# Patient Record
Sex: MALE | Race: BLACK OR AFRICAN AMERICAN | Employment: FULL TIME | URBAN - METROPOLITAN AREA
[De-identification: names, ages, dates, MRNs, and addresses within clinical notes are randomized per-mention and may not be internally consistent; named-entity substitution may affect disease eponyms.]

---

## 2021-08-27 ENCOUNTER — HOSPITAL ENCOUNTER (EMERGENCY)
Age: 46
Discharge: HOME | End: 2021-08-27
Payer: MEDICAID

## 2021-08-27 ENCOUNTER — APPOINTMENT (OUTPATIENT)
Dept: CT IMAGING | Age: 46
End: 2021-08-27
Payer: MEDICAID

## 2021-08-27 VITALS
HEIGHT: 69 IN | OXYGEN SATURATION: 98 % | TEMPERATURE: 97.9 F | BODY MASS INDEX: 32.58 KG/M2 | DIASTOLIC BLOOD PRESSURE: 95 MMHG | RESPIRATION RATE: 16 BRPM | HEART RATE: 67 BPM | WEIGHT: 220 LBS | SYSTOLIC BLOOD PRESSURE: 143 MMHG

## 2021-08-27 DIAGNOSIS — R03.0 ELEVATED BLOOD PRESSURE READING WITHOUT DIAGNOSIS OF HYPERTENSION: ICD-10-CM

## 2021-08-27 DIAGNOSIS — R10.9 LEFT FLANK PAIN: Primary | ICD-10-CM

## 2021-08-27 LAB
A/G RATIO: 1.6 (ref 1.1–2.2)
ALBUMIN SERPL-MCNC: 4.5 G/DL (ref 3.4–5)
ALP BLD-CCNC: 73 U/L (ref 40–129)
ALT SERPL-CCNC: 15 U/L (ref 10–40)
ANION GAP SERPL CALCULATED.3IONS-SCNC: 12 MMOL/L (ref 3–16)
AST SERPL-CCNC: 16 U/L (ref 15–37)
BASOPHILS ABSOLUTE: 0.1 K/UL (ref 0–0.2)
BASOPHILS RELATIVE PERCENT: 0.8 %
BILIRUB SERPL-MCNC: 1.2 MG/DL (ref 0–1)
BILIRUBIN URINE: NEGATIVE
BLOOD, URINE: NEGATIVE
BUN BLDV-MCNC: 7 MG/DL (ref 7–20)
CALCIUM SERPL-MCNC: 9.8 MG/DL (ref 8.3–10.6)
CHLORIDE BLD-SCNC: 100 MMOL/L (ref 99–110)
CLARITY: ABNORMAL
CO2: 26 MMOL/L (ref 21–32)
COLOR: YELLOW
CREAT SERPL-MCNC: 0.9 MG/DL (ref 0.9–1.3)
EOSINOPHILS ABSOLUTE: 0 K/UL (ref 0–0.6)
EOSINOPHILS RELATIVE PERCENT: 0.5 %
EPITHELIAL CELLS, UA: NORMAL /HPF (ref 0–5)
GFR AFRICAN AMERICAN: >60
GFR NON-AFRICAN AMERICAN: >60
GLOBULIN: 2.9 G/DL
GLUCOSE BLD-MCNC: 126 MG/DL (ref 70–99)
GLUCOSE URINE: NEGATIVE MG/DL
HCT VFR BLD CALC: 43.9 % (ref 40.5–52.5)
HEMOGLOBIN: 14.7 G/DL (ref 13.5–17.5)
KETONES, URINE: 40 MG/DL
LEUKOCYTE ESTERASE, URINE: NEGATIVE
LIPASE: 63 U/L (ref 13–60)
LYMPHOCYTES ABSOLUTE: 1.6 K/UL (ref 1–5.1)
LYMPHOCYTES RELATIVE PERCENT: 19.6 %
MCH RBC QN AUTO: 26.3 PG (ref 26–34)
MCHC RBC AUTO-ENTMCNC: 33.6 G/DL (ref 31–36)
MCV RBC AUTO: 78.3 FL (ref 80–100)
MICROSCOPIC EXAMINATION: YES
MONOCYTES ABSOLUTE: 0.6 K/UL (ref 0–1.3)
MONOCYTES RELATIVE PERCENT: 6.7 %
NEUTROPHILS ABSOLUTE: 6 K/UL (ref 1.7–7.7)
NEUTROPHILS RELATIVE PERCENT: 72.4 %
NITRITE, URINE: NEGATIVE
PDW BLD-RTO: 14.6 % (ref 12.4–15.4)
PH UA: 6.5 (ref 5–8)
PLATELET # BLD: 236 K/UL (ref 135–450)
PMV BLD AUTO: 7.8 FL (ref 5–10.5)
POTASSIUM REFLEX MAGNESIUM: 3.9 MMOL/L (ref 3.5–5.1)
PROTEIN UA: NEGATIVE MG/DL
RBC # BLD: 5.6 M/UL (ref 4.2–5.9)
RBC UA: NORMAL /HPF (ref 0–4)
SODIUM BLD-SCNC: 138 MMOL/L (ref 136–145)
SPECIFIC GRAVITY UA: 1 (ref 1–1.03)
TOTAL PROTEIN: 7.4 G/DL (ref 6.4–8.2)
URINE REFLEX TO CULTURE: ABNORMAL
URINE TYPE: ABNORMAL
UROBILINOGEN, URINE: 0.2 E.U./DL
WBC # BLD: 8.2 K/UL (ref 4–11)
WBC UA: NORMAL /HPF (ref 0–5)

## 2021-08-27 PROCEDURE — 80053 COMPREHEN METABOLIC PANEL: CPT

## 2021-08-27 PROCEDURE — 85025 COMPLETE CBC W/AUTO DIFF WBC: CPT

## 2021-08-27 PROCEDURE — 83690 ASSAY OF LIPASE: CPT

## 2021-08-27 PROCEDURE — 81001 URINALYSIS AUTO W/SCOPE: CPT

## 2021-08-27 PROCEDURE — 74176 CT ABD & PELVIS W/O CONTRAST: CPT

## 2021-08-27 PROCEDURE — 36415 COLL VENOUS BLD VENIPUNCTURE: CPT

## 2021-08-27 PROCEDURE — 99283 EMERGENCY DEPT VISIT LOW MDM: CPT

## 2021-08-27 ASSESSMENT — ENCOUNTER SYMPTOMS
CONSTIPATION: 0
NAUSEA: 0
DIARRHEA: 0
VOMITING: 0
ABDOMINAL PAIN: 1
SHORTNESS OF BREATH: 0
COLOR CHANGE: 0
BACK PAIN: 1

## 2021-08-27 ASSESSMENT — PAIN DESCRIPTION - PAIN TYPE: TYPE: ACUTE PAIN

## 2021-08-27 ASSESSMENT — PAIN DESCRIPTION - LOCATION: LOCATION: BACK;ABDOMEN

## 2021-08-27 ASSESSMENT — PAIN SCALES - GENERAL: PAINLEVEL_OUTOF10: 4

## 2021-08-27 NOTE — ED PROVIDER NOTES
**EVALUATED BY LUCÍA**    [address] [facility]  eMERGENCY dEPARTMENT eNCOUnter    Pt Name: Paulita Brunner  MRN: [id number]  Jessgfskylar 1975  Date of evaluation: 8/27/2021  Provider: RON Lewis    Chief Complaint:    Chief Complaint   Patient presents with    Flank Pain     Left sided flank pain radiating to front of abd. Intermittent x 1 month. Nursing Notes, Past Medical Hx, Past Surgical Hx, Social Hx, Allergies, and Family Hx were all reviewed and agreedwith or any disagreements were addressed in the HPI.    HPI:  (Location, Duration, Timing, Severity, Quality, Assoc Sx, Context, Modifying factors)  This is a  39 y.o. male who presents to the emergency department with complaints of intermittent left-sided flank pain radiating into the left front abdomen intermittently for 1 month. Patient states that he used to be a heavy drinker and has recently started improving his diet, cut out drinking and increasing his fiber intake. He is trying to become healthier. He is concerned that his several years of drinking has messed up his kidneys and he is very tearful during examination that there could be something wrong with his kidneys. No prior history of kidney disease. No prior history of hypertension. Has not been to see a primary care physician in over 3 years, called his old PCP but was refused since he has not been to the office in 3 years. He presents today worried about kidney function. At this time he denies any abdominal pain or flank pain but states that he does intermittently have left-sided flank pain that radiates into the left side. This does not radiate however into the genitals at all. He states that sometimes he does report increased urination and decreased urinary output but denies any dysuria hematuria or penile or scrotal discomfort swelling or pain. No alleviating factors.   He states that intermittently he will smoke a joint and yesterday he smoked half of a joint and had the same pain, he is unsure if this is related. He has had intentional weight loss secondary to becoming healthier. Denies fevers chills cough congestion or chest pain. No rash or skin discoloration. All other systems were reviewed and are negative. Past Medical/Surgical History:  History reviewed. No pertinent past medical history. History reviewed. No pertinent surgical history. Medications: There are no discharge medications for this patient. Review of Systems:  Review of Systems   Constitutional: Negative for chills and fever. Respiratory: Negative for shortness of breath. Cardiovascular: Negative for chest pain. Gastrointestinal: Positive for abdominal pain (intermittent one month). Negative for constipation, diarrhea, nausea and vomiting. Genitourinary: Positive for flank pain (intermittent 1 month). Negative for difficulty urinating, discharge, dysuria, frequency, genital sores, hematuria, penile pain, penile swelling, scrotal swelling, testicular pain and urgency. Musculoskeletal: Positive for back pain. Negative for neck pain. Skin: Negative for color change and rash. All other systems reviewed and are negative. Positives and Pertinent negatives as per HPI. Except as noted above in the ROS, all other systems were reviewed/completed and are negative. Physical Exam:  Physical Exam  Vitals and nursing note reviewed. Constitutional:       Appearance: Normal appearance. He is well-developed. He is not toxic-appearing or diaphoretic. HENT:      Head: Normocephalic. Right Ear: External ear normal.      Left Ear: External ear normal.      Nose: Nose normal.   Eyes:      General:         Right eye: No discharge. Left eye: No discharge. Conjunctiva/sclera: Conjunctivae normal.   Cardiovascular:      Rate and Rhythm: Normal rate and regular rhythm. Heart sounds: Normal heart sounds. No murmur heard. No friction rub. No gallop. Pulmonary:      Effort: Pulmonary effort is normal. No respiratory distress. Breath sounds: Normal breath sounds. No wheezing or rales. Abdominal:      General: Bowel sounds are normal. There is no distension. Palpations: Abdomen is soft. There is no mass or pulsatile mass. Tenderness: There is no abdominal tenderness. There is no guarding or rebound. Musculoskeletal:         General: Normal range of motion. Cervical back: Normal range of motion and neck supple. Skin:     General: Skin is warm and dry. Coloration: Skin is not pale. Neurological:      Mental Status: He is alert and oriented to person, place, and time. Psychiatric:         Behavior: Behavior normal. Behavior is cooperative.          MEDICAL DECISION MAKING    Vitals:    Vitals:    08/27/21 0845 08/27/21 0851 08/27/21 1012   BP: (!) 156/75  (!) 143/95   Pulse: 75  67   Resp: 18  16   Temp: 98.3 °F (36.8 °C)  97.9 °F (36.6 °C)   TempSrc: Oral  Infrared   SpO2: 98%  98%   Weight:  220 lb (99.8 kg)    Height:  5' 9\" (1.753 m)        LABS:   Labs Reviewed   CBC WITH AUTO DIFFERENTIAL - Abnormal; Notable for the following components:       Result Value    MCV 78.3 (*)     All other components within normal limits    Narrative:     Performed at:  [facility] [address], [place], [address]   Phone [phone]   COMPREHENSIVE METABOLIC PANEL W/ REFLEX TO MG FOR LOW K - Abnormal; Notable for the following components:    Glucose 126 (*)     Total Bilirubin 1.2 (*)     All other components within normal limits    Narrative:     Performed at:  [facility] [address], [place], [address]   Phone [phone]   LIPASE - Abnormal; Notable for the following components:    Lipase 63.0 (*)     All other components within normal limits    Narrative:     Performed at:  [facility] [address], [place] [address]   Phone [phone]   URINE RT REFLEX TO CULTURE - Abnormal; Notable for the following components:    Clarity, UA CLOUDY (*)     Ketones, Urine 40 (*)     All other components within normal limits    Narrative:     Performed at:  [facility]  [address] [facility], [address] [facility]   Phone [phone]   MICROSCOPIC URINALYSIS    Narrative:     Performed at:  [facility]  [address] [facility], [address] [facility]   Phone [phone] of labs reviewed and were negative at this time or not returned at the time of this note. RADIOLOGY:   Non-plain film images suchas CT, Ultrasound and MRI are read by the radiologist. Alesha DONOVAN PA have directly visualized the radiologic plain film image(s) with the below findings:  Interpretation per the Radiologist below, if available at the time of this note:    CT ABDOMEN PELVIS WO CONTRAST Additional Contrast? None   Final Result   1. No acute process. In particular, no urolithiasis or obstructive uropathy   2. Colonic diverticulosis              CT ABDOMEN PELVIS WO CONTRAST Additional Contrast? None    Result Date: 8/27/2021  EXAMINATION: CT OF THE ABDOMEN AND PELVIS WITHOUT CONTRAST 8/27/2021 9:08 am TECHNIQUE: CT of the abdomen and pelvis was performed without the administration of intravenous contrast. Multiplanar reformatted images are provided for review. Dose modulation, iterative reconstruction, and/or weight based adjustment of the mA/kV was utilized to reduce the radiation dose to as low as reasonably achievable. COMPARISON: None.  HISTORY: ORDERING SYSTEM PROVIDED HISTORY: left flank pain, radiation left abdomen TECHNOLOGIST PROVIDED HISTORY: Reason for exam:->left flank pain, radiation left abdomen Additional Contrast?->None Decision Support Exception - unselect if not a suspected or confirmed emergency medical condition->Emergency Medical Condition (MA) Reason for Exam: left flank pain, radiation left abdomen Acuity: Unknown Type of Exam: Unknown FINDINGS: Lower Chest: Lung bases clear Organs: No urolithiasis or hydronephrosis. Solid organs and gallbladder have an unremarkable noncontrast appearance except for 1 cm hepatic cyst. GI/Bowel: Colonic diverticula with no associated inflammation. No other gastrointestinal abnormality. Normal appendix Pelvis: Urinary bladder unremarkable. No pelvic fluid Peritoneum/Retroperitoneum: No ascites or pneumoperitoneum. Normal caliber aorta Bones/Soft Tissues: No acute bony abnormality. Degenerative disc disease L4-L5 and L5-S1     1. No acute process. In particular, no urolithiasis or obstructive uropathy 2. Colonic diverticulosis        MEDICAL DECISION MAKING / ED COURSE:      PROCEDURES:   Procedures    Patient was given:  Medications - No data to display  This is a  39 y.o. male who presents to the emergency department with complaints of intermittent left-sided flank pain radiating into the left front abdomen intermittently for 1 month. Patient states that he used to be a heavy drinker and has recently started improving his diet, cut out drinking and increasing his fiber intake. He is trying to become healthier. He is concerned that his several years of drinking has messed up his kidneys and he is very tearful during examination that there could be something wrong with his kidneys. No prior history of kidney disease. No prior history of hypertension. Has not been to see a primary care physician in over 3 years, called his old PCP but was refused since he has not been to the office in 3 years. He presents today worried about kidney function. At this time he denies any abdominal pain or flank pain but states that he does intermittently have left-sided flank pain that radiates into the left side. This does not radiate however into the genitals at all.   He states that sometimes he does report increased urination and decreased urinary output but denies any dysuria hematuria or penile or scrotal discomfort swelling or pain. No alleviating factors. He states that intermittently he will smoke a joint and yesterday he smoked half of a joint and had the same pain, he is unsure if this is related. He has had intentional weight loss secondary to becoming healthier. Denies fevers chills cough congestion or chest pain. No rash or skin discoloration. On exam patient is very well-appearing, at this time he is actually no CVA tenderness on either left or right flank. No abdominal tenderness with deep palpation or evidence of mass. He is very concerned though of his kidney function and this intermittent pain. CBC, chemistry, lipase all obtained as well as urinalysis, he does have ketones in his urinalysis. He states that he has not been eating as much because he is anxious about his symptoms. This could explain the ketones. Does have a normal white count of 8.2, kidney function is normal with a BUN of 7 and creatinine of 0.9. Lipase is only minimally elevated at 63, I believe this is probably chronically elevated but only minimally because of his previous history of alcohol use. He does not have any pain to the right upper quadrant ever. Evidence of colonic diverticulosis without diverticulitis. Discussed this with patient as well. He is referred to outpatient primary care physician. For home no medication change. He is made aware that his blood pressure is elevated 143/95 however he is asymptomatic. He will need to have this followed up outpatient. He has been warned that this could lead to kidney disease. He is verbally agreeable to this plan of care. The patient presents with abdominal pain/flank pain. The patient is feeling better with a benign repeat examination. I see nothing that would suggest an acute abdomen at this time.   Based on history, physical exam, risk factors, and tests; my suspicion for bowel

## 2021-08-27 NOTE — ED NOTES
Patient discharged in stable condition. Instructions reviewed. Given opportunity to ask questions if needed and patient verbalized understanding. All questions answered.        Jeana Sosa RN  08/27/21 0768

## 2021-08-30 NOTE — PROGRESS NOTES
2021    Gregoria Davalos (:  1975) is a 39 y.o. male, here for evaluation of the following medical concerns:    Chief Complaint   Patient presents with    [facility]     HTN GI problem       HPI  44-year-old male with history of alcohol and marijuana abuse, hypertension historically on lisinopril 40 mg, obesity BMI upper 30s, former cigarette smoker quit , seen [facility]  for 1 month history of left flank pain CT notable for diverticulosis only mild CBC CMP lipase urinalysis only notable for ketonuria, creatinine 0.7, minimally elevated lipase at 63 without radiographic findings for chronic pancreatitis, noted to have blood pressure 143/95 obviously off all blood pressure medications as he had not been seen in by his PCP since 2018 who comes in to [facility]. He tells me that he was drinking 1/5 of alcohol a day up until several months ago, and having to sit on the toilet and strain to move his bowels even once a day. He then began to notice wipe rectal bleeding, and may have noticed a prolapsing internal hemorrhoid, which led him stop drinking and start Benefiber and lose weight. He was not anemic though microcytic in the ED. He could not tolerate lisinopril because it gave him urinary frequency, but did not follow-up till now      Review of Systems   Constitutional: Negative for activity change, appetite change, fatigue and unexpected weight change. HENT: Negative for dental problem, sinus pain, sore throat and trouble swallowing. Eyes: Negative for pain and visual disturbance. Respiratory: Negative for apnea, cough, chest tightness, shortness of breath and wheezing. Cardiovascular: Negative for chest pain and palpitations. Gastrointestinal: Negative for abdominal pain, blood in stool, constipation, diarrhea, nausea, rectal pain and vomiting. Endocrine: Negative for cold intolerance, heat intolerance, polydipsia, polyphagia and polyuria. Genitourinary: Negative for difficulty urinating, dysuria, flank pain, frequency, hematuria, pelvic pain, urgency,. Musculoskeletal: Negative for arthralgias, back pain, gait problem, joint swelling, myalgias, neck pain and neck stiffness. Skin: Negative for color change and rash. Neurological: Negative for dizziness, tremors, syncope, speech difficulty, weakness, light-headedness and headaches. Hematological: Negative for adenopathy. Does not bruise/bleed easily. Psychiatric/Behavioral: Negative for agitation, behavioral problems, decreased concentration, sleep disturbance and suicidal ideas. The patient is not nervous/anxious and is not hyperactive. Prior to Visit Medications    Medication Sig Taking? Authorizing Provider   candesartan (ATACAND) 16 MG tablet Take 1 tablet by mouth daily Yes Garrison Velez MD   amLODIPine (NORVASC) 5 MG tablet Take 1 tablet by mouth daily Yes Garrison Velez MD        No Known Allergies    No past medical history on file. No past surgical history on file.     Social History     Socioeconomic History    Marital status: Single     Spouse name: Not on file    Number of children: Not on file    Years of education: Not on file    Highest education level: Not on file   Occupational History    Not on file   Tobacco Use    Smoking status: Former Smoker     Types: E-Cigarettes    Smokeless tobacco: Never Used   Substance and Sexual Activity    Alcohol use: Not Currently     Comment: quit 4 months ago     Drug use: Not Currently     Types: Marijuana    Sexual activity: Not on file   Other Topics Concern    Not on file   Social History Narrative    Not on file     Social Determinants of Health     Financial Resource Strain: Low Risk     Difficulty of Paying Living Expenses: Not very hard   Food Insecurity: No Food Insecurity    Worried About Running Out of Food in the Last Year: Never true    Margaret of Food in the Last Year: Never true   Transportation Needs:     Lack of Transportation (Medical):  Lack of Transportation (Non-Medical):    Physical Activity:     Days of Exercise per Week:     Minutes of Exercise per Session:    Stress:     Feeling of Stress :    Social Connections:     Frequency of Communication with Friends and Family:     Frequency of Social Gatherings with Friends and Family:     Attends [employer] Services:     Active Member of Clubs or Organizations:     Attends Club or Organization Meetings:     Marital Status:    Intimate Partner Violence:     Fear of Current or Ex-Partner:     Emotionally Abused:     Physically Abused:     Sexually Abused:         No family history on file. Vitals:    09/01/21 0838 09/01/21 0844   BP: (!) 149/82 (!) 152/90   Pulse: 93    Temp: 98.2 °F (36.8 °C)    TempSrc: Temporal    SpO2: 100%    Weight: 213 lb 12.8 oz (97 kg)      Estimated body mass index is 31.57 kg/m² as calculated from the following:    Height as of 8/27/21: 5' 9\" (1.753 m). Weight as of this encounter: 213 lb 12.8 oz (97 kg). PHYSICAL EXAM  GENERAL:  Pleasant  male who looks his stated age, awake alert and oriented x3, no acute distress. HEENT:  Normocephalic atraumatic. Pupils equal round reactive light and accommodation, extra ocular muscles are intact. Oropharynx is clear moist without injection or exudate. Tongue and palate move normally. Turbinates appear normal.  Tympanic membranes appear normal.  NECK:  Supple nontender. No carotid bruits. Brisk carotid upstrokes, no JVD. No thyromegaly. LYMPH:  No supraclavicular cervical axillary or inguinal lymphadenopathy. LUNGS:  Clear to auscultation bilaterally. Excellent air entry. No inspiratory crackles or expiratory wheezes. HEART:  Regular rate and rhythm with soft aortic sclerosis ejection murmur, 1 out of 6 left upper sternal border, no rub gallop S3 or S4. ABDOMEN:  Soft, nontender, mildly obese. Normal bowel sounds. No guarding. No masses. No AAA or renal artery bruit. UROGENITAL:  Deferred  EXTREMITIES:  Warm and well perfused without clubbing cyanosis or edema. 2+ pulses in all 4 extremities. Capillary refill less than 2 seconds. NEURO:  Cranial nerves 2-12 grossly intact. Normal muscle bulk and tone. No resting tremor, cogwheeling, normal rapid alternating movements in the hands and feet. No stocking paresthesia. Normal gait and station. MUSCULOSKELETAL:  Mild osteoarthritic changes. SKIN:  No worrisome lesions, skin a little dry. PSYCH:  No psychomotor retardation or agitation. Good eye contact. Unrestricted affect range. Mood congruent with affect. Linear thought.     LABS  Lab Review   Admission on 08/27/2021, Discharged on 08/27/2021   Component Date Value    WBC 08/27/2021 8.2     RBC 08/27/2021 5.60     Hemoglobin 08/27/2021 14.7     Hematocrit 08/27/2021 43.9     MCV 08/27/2021 78.3*    MCH 08/27/2021 26.3     MCHC 08/27/2021 33.6     RDW 08/27/2021 14.6     Platelets 61/21/3594 236     MPV 08/27/2021 7.8     Neutrophils % 08/27/2021 72.4     Lymphocytes % 08/27/2021 19.6     Monocytes % 08/27/2021 6.7     Eosinophils % 08/27/2021 0.5     Basophils % 08/27/2021 0.8     Neutrophils Absolute 08/27/2021 6.0     Lymphocytes Absolute 08/27/2021 1.6     Monocytes Absolute 08/27/2021 0.6     Eosinophils Absolute 08/27/2021 0.0     Basophils Absolute 08/27/2021 0.1     Sodium 08/27/2021 138     Potassium reflex Magnesi* 08/27/2021 3.9     Chloride 08/27/2021 100     CO2 08/27/2021 26     Anion Gap 08/27/2021 12     Glucose 08/27/2021 126*    BUN 08/27/2021 7     CREATININE 08/27/2021 0.9     GFR Non- 08/27/2021 >60     GFR  08/27/2021 >60     Calcium 08/27/2021 9.8     Total Protein 08/27/2021 7.4     Albumin 08/27/2021 4.5     Albumin/Globulin Ratio 08/27/2021 1.6     Total Bilirubin 08/27/2021 1.2*    Alkaline Phosphatase 08/27/2021 73     ALT 08/27/2021 15     AST 08/27/2021 16     Globulin 08/27/2021 2.9     Lipase 08/27/2021 63.0*    Color, UA 08/27/2021 YELLOW     Clarity, UA 08/27/2021 CLOUDY*    Glucose, Ur 08/27/2021 Negative     Bilirubin Urine 08/27/2021 Negative     Ketones, Urine 08/27/2021 40*    Specific Gravity, UA 08/27/2021 1.005     Blood, Urine 08/27/2021 Negative     pH, UA 08/27/2021 6.5     Protein, UA 08/27/2021 Negative     Urobilinogen, Urine 08/27/2021 0.2     Nitrite, Urine 08/27/2021 Negative     Leukocyte Esterase, Urine 08/27/2021 Negative     Microscopic Examination 08/27/2021 YES     Urine Type 08/27/2021 NotGiven     Urine Reflex to Culture 08/27/2021 Not Indicated     WBC, UA 08/27/2021 0-2     RBC, UA 08/27/2021 None seen     Epithelial Cells, UA 08/27/2021 0-1          ASSESSMENT/PLAN  1. Essential hypertension  Is limiting sodium. He will ask bed partner about sleep apnea stop bang 4 out of 8. Start amlodipine 5 mg at bedtime now that he is no longer drinking and taking a fiber supplement. Asked him to bring home blood pressure readings and cuff to follow-up visit. 2. Microcytosis  We will work-up for hemoglobinopathy such as sickle trait, iron deficiency.  - HEMOGLOBINOPATHY EVALUATION; Future  - Iron and TIBC; Future  - RETICULOCYTES; Future  - TSH with Reflex; Future    3. Routine adult health maintenance  Review immunizations after labs. - TSH with Reflex; Future  - Vitamin D 25 Hydroxy; Future  - Lipid Panel; Future  - Hemoglobin A1C; Future  - HIV Screen; Future  - Hep C AB RLFX HCV PCR-A; Future  - PSA, Total and Free; Future    4. Rectal bleeding  Family history inflammatory bowel disease colon cancer. He has never had a colonoscopy. Classic history of antecedent longstanding constipation requiring straining, leading to wipe rectal bleeding and anal pruritus, however will need to exclude higher source. He has not had any rectal bleeding for couple months now but he is worried.   Certainly he may have hemorrhoids that need to be banded. After labs will refer for colonoscopy possibly EGD given potential risk for  esophageal varices  - POCT Fecal Immunochemical Test (FIT); Future  - H. PYLORI ANTIGEN, STOOL; Future    5. Alcohol abuse  Currently abstinent. No thrombocytopenia on recent labs. Will check a couple of other labs, given borderline elevation lipase though exam completely normal and no radiographic signs of pancreatitis. - Protime-INR; Future  - AMYLASE; Future      Return in about 2 weeks (around 9/15/2021). It was a pleasure to visit with Mr. Helga Anton today. Answered all questions as best I could.   Elvin Onofre MD   Time 32 minutes

## 2021-09-01 ENCOUNTER — OFFICE VISIT (OUTPATIENT)
Dept: PRIMARY CARE CLINIC | Age: 46
End: 2021-09-01
Payer: MEDICAID

## 2021-09-01 VITALS
SYSTOLIC BLOOD PRESSURE: 152 MMHG | OXYGEN SATURATION: 100 % | WEIGHT: 213.8 LBS | TEMPERATURE: 98.2 F | BODY MASS INDEX: 31.57 KG/M2 | DIASTOLIC BLOOD PRESSURE: 90 MMHG | HEART RATE: 93 BPM

## 2021-09-01 DIAGNOSIS — F10.10 ALCOHOL ABUSE: ICD-10-CM

## 2021-09-01 DIAGNOSIS — Z00.00 ROUTINE ADULT HEALTH MAINTENANCE: ICD-10-CM

## 2021-09-01 DIAGNOSIS — K62.5 RECTAL BLEEDING: ICD-10-CM

## 2021-09-01 DIAGNOSIS — R71.8 MICROCYTOSIS: ICD-10-CM

## 2021-09-01 DIAGNOSIS — I10 ESSENTIAL HYPERTENSION: Primary | ICD-10-CM

## 2021-09-01 PROCEDURE — 1036F TOBACCO NON-USER: CPT | Performed by: INTERNAL MEDICINE

## 2021-09-01 PROCEDURE — G8427 DOCREV CUR MEDS BY ELIG CLIN: HCPCS | Performed by: INTERNAL MEDICINE

## 2021-09-01 PROCEDURE — 99203 OFFICE O/P NEW LOW 30 MIN: CPT | Performed by: INTERNAL MEDICINE

## 2021-09-01 PROCEDURE — G8417 CALC BMI ABV UP PARAM F/U: HCPCS | Performed by: INTERNAL MEDICINE

## 2021-09-01 RX ORDER — AMLODIPINE BESYLATE 5 MG/1
5 TABLET ORAL DAILY
Qty: 30 TABLET | Refills: 5 | Status: SHIPPED | OUTPATIENT
Start: 2021-09-01 | End: 2022-03-17

## 2021-09-01 RX ORDER — CANDESARTAN 16 MG/1
16 TABLET ORAL DAILY
Qty: 30 TABLET | Refills: 3 | Status: SHIPPED | OUTPATIENT
Start: 2021-09-01 | End: 2021-09-03

## 2021-09-01 SDOH — ECONOMIC STABILITY: FOOD INSECURITY: WITHIN THE PAST 12 MONTHS, THE FOOD YOU BOUGHT JUST DIDN'T LAST AND YOU DIDN'T HAVE MONEY TO GET MORE.: NEVER TRUE

## 2021-09-01 SDOH — ECONOMIC STABILITY: FOOD INSECURITY: WITHIN THE PAST 12 MONTHS, YOU WORRIED THAT YOUR FOOD WOULD RUN OUT BEFORE YOU GOT MONEY TO BUY MORE.: NEVER TRUE

## 2021-09-01 ASSESSMENT — PATIENT HEALTH QUESTIONNAIRE - PHQ9
2. FEELING DOWN, DEPRESSED OR HOPELESS: 0
SUM OF ALL RESPONSES TO PHQ QUESTIONS 1-9: 0
1. LITTLE INTEREST OR PLEASURE IN DOING THINGS: 0
SUM OF ALL RESPONSES TO PHQ QUESTIONS 1-9: 0
SUM OF ALL RESPONSES TO PHQ QUESTIONS 1-9: 0
SUM OF ALL RESPONSES TO PHQ9 QUESTIONS 1 & 2: 0

## 2021-09-01 ASSESSMENT — SOCIAL DETERMINANTS OF HEALTH (SDOH): HOW HARD IS IT FOR YOU TO PAY FOR THE VERY BASICS LIKE FOOD, HOUSING, MEDICAL CARE, AND HEATING?: NOT VERY HARD

## 2021-09-01 NOTE — PATIENT INSTRUCTIONS
1. Ask your wife if you stop breathing while asleep snoring  2. Start amlodipine 5 at bedtime  3. Limit sodium in your diet (<2000mg/day)  4. Fasting labs in a week, see me after  5. Colonoscopy after labs and see me back  6. Stool test  7.  Get a bp cuff $30-60 check bp 10x over next 2 weeks and bring cuff and readings to f/u visit

## 2021-09-02 ENCOUNTER — TELEPHONE (OUTPATIENT)
Dept: ORTHOPEDIC SURGERY | Age: 46
End: 2021-09-02

## 2021-09-03 RX ORDER — IRBESARTAN 150 MG/1
150 TABLET ORAL DAILY
Qty: 90 TABLET | Refills: 1 | Status: SHIPPED | OUTPATIENT
Start: 2021-09-03

## 2021-09-03 NOTE — TELEPHONE ENCOUNTER
Received DENIAL for Candesartan Cilexetil 16MG tablets. Denial letter attached. Please advise patient. Thank you.

## 2021-09-15 ENCOUNTER — OFFICE VISIT (OUTPATIENT)
Dept: PRIMARY CARE CLINIC | Age: 46
End: 2021-09-15
Payer: MEDICAID

## 2021-09-15 VITALS — SYSTOLIC BLOOD PRESSURE: 132 MMHG | HEART RATE: 92 BPM | DIASTOLIC BLOOD PRESSURE: 82 MMHG

## 2021-09-15 DIAGNOSIS — Z23 NEED FOR TDAP VACCINATION: ICD-10-CM

## 2021-09-15 DIAGNOSIS — K62.5 RECTAL BLEEDING: ICD-10-CM

## 2021-09-15 DIAGNOSIS — D57.3 SICKLE CELL TRAIT (HCC): ICD-10-CM

## 2021-09-15 DIAGNOSIS — I10 ESSENTIAL HYPERTENSION: ICD-10-CM

## 2021-09-15 DIAGNOSIS — K62.5 RECTAL BLEEDING: Primary | ICD-10-CM

## 2021-09-15 LAB
CONTROL: PRESENT
HEMOCCULT STL QL: NEGATIVE

## 2021-09-15 PROCEDURE — 90471 IMMUNIZATION ADMIN: CPT | Performed by: INTERNAL MEDICINE

## 2021-09-15 PROCEDURE — G8427 DOCREV CUR MEDS BY ELIG CLIN: HCPCS | Performed by: INTERNAL MEDICINE

## 2021-09-15 PROCEDURE — 1036F TOBACCO NON-USER: CPT | Performed by: INTERNAL MEDICINE

## 2021-09-15 PROCEDURE — G8417 CALC BMI ABV UP PARAM F/U: HCPCS | Performed by: INTERNAL MEDICINE

## 2021-09-15 PROCEDURE — 82274 ASSAY TEST FOR BLOOD FECAL: CPT | Performed by: INTERNAL MEDICINE

## 2021-09-15 PROCEDURE — 90715 TDAP VACCINE 7 YRS/> IM: CPT | Performed by: INTERNAL MEDICINE

## 2021-09-15 PROCEDURE — 99214 OFFICE O/P EST MOD 30 MIN: CPT | Performed by: INTERNAL MEDICINE

## 2021-09-15 RX ORDER — ERGOCALCIFEROL 1.25 MG/1
50000 CAPSULE ORAL WEEKLY
Qty: 12 CAPSULE | Refills: 3 | Status: SHIPPED | OUTPATIENT
Start: 2021-09-15

## 2021-09-15 NOTE — PROGRESS NOTES
9/15/2021    Dee Romberg (:  1975) is a 39 y.o. male, here for evaluation of the following medical concerns:    No chief complaint on file. HPI  47-year-old male with history of alcohol and marijuana abuse, hypertension historically on lisinopril 40 mg, obesity BMI upper 30s, former cigarette smoker quit , seen Dorene Joe  for 1 month history of left flank pain CT notable for diverticulosis only mild CBC CMP lipase urinalysis only notable for ketonuria, creatinine 0.7, minimally elevated lipase at 65 without radiographic findings for chronic pancreatitis, noted to have blood pressure 143/95 obviously off all blood pressure medications as he had not been seen in by his PCP since 2018 who  subsequently established care. He reported prior drinking 1/5 of alcohol a day up until several months ago, and having to sit on the toilet and strain to move his bowels even once a day. He then began to notice wipe rectal bleeding, and may have noticed a prolapsing internal hemorrhoid, which led him stop drinking and start Benefiber and lose weight. He was not anemic though microcytic in the ED. He could not tolerate lisinopril because it gave him urinary frequency, but did not follow-up till now    Review of Systems   Constitutional: Negative for activity change, appetite change, fatigue and unexpected weight change. HENT: Negative for dental problem, sinus pain, sore throat and trouble swallowing. Eyes: Negative for pain and visual disturbance. Respiratory: Negative for apnea, cough, chest tightness, shortness of breath and wheezing. Cardiovascular: Negative for chest pain and palpitations. Gastrointestinal: Negative for abdominal pain,  diarrhea, nausea, rectal pain and vomiting. Positive for blood in stool, constipation,  Endocrine: Negative for cold intolerance, heat intolerance, polydipsia, polyphagia and polyuria.    Genitourinary: Negative for difficulty urinating, dysuria, flank pain, frequency, hematuria, pelvic pain, urgency,. Musculoskeletal: Negative for arthralgias, back pain, gait problem, joint swelling, myalgias, neck pain and neck stiffness. Skin: Negative for color change and rash. Neurological: Negative for dizziness, tremors, syncope, speech difficulty, weakness, light-headedness and headaches. Hematological: Negative for adenopathy. Does not bruise/bleed easily. Psychiatric/Behavioral: Negative for agitation, behavioral problems, decreased concentration, sleep disturbance and suicidal ideas. The patient is not nervous/anxious and is not hyperactive. Prior to Visit Medications    Medication Sig Taking? Authorizing Provider   irbesartan (AVAPRO) 150 MG tablet Take 1 tablet by mouth daily  Airam Alatorre MD   amLODIPine (NORVASC) 5 MG tablet Take 1 tablet by mouth daily  Airam Alatorre MD        No Known Allergies    No past medical history on file. No past surgical history on file.     Social History     Socioeconomic History    Marital status: Single     Spouse name: Not on file    Number of children: Not on file    Years of education: Not on file    Highest education level: Not on file   Occupational History    Not on file   Tobacco Use    Smoking status: Former Smoker     Types: E-Cigarettes    Smokeless tobacco: Never Used   Substance and Sexual Activity    Alcohol use: Not Currently     Comment: quit 4 months ago     Drug use: Not Currently     Types: Marijuana    Sexual activity: Not on file   Other Topics Concern    Not on file   Social History Narrative    Not on file     Social Determinants of Health     Financial Resource Strain: Low Risk     Difficulty of Paying Living Expenses: Not very hard   Food Insecurity: No Food Insecurity    Worried About Running Out of Food in the Last Year: Never true    Margaret of Food in the Last Year: Never true   Transportation Needs:     Lack of Transportation (Medical):    [facility] Lack of Transportation (Non-Medical):    Physical Activity:     Days of Exercise per Week:     Minutes of Exercise per Session:    Stress:     Feeling of Stress :    Social Connections:     Frequency of Communication with Friends and Family:     Frequency of Social Gatherings with Friends and Family:     Attends Congregation Services:     Active Member of Clubs or Organizations:     Attends Club or Organization Meetings:     Marital Status:    Intimate Partner Violence:     Fear of Current or Ex-Partner:     Emotionally Abused:     Physically Abused:     Sexually Abused:         No family history on file. There were no vitals filed for this visit. Estimated body mass index is 31.57 kg/m² as calculated from the following:    Height as of 8/27/21: 5' 9\" (1.753 m). Weight as of 9/1/21: 213 lb 12.8 oz (97 kg). PHYSICAL EXAM  GENERAL:  Pleasant  male who looks his stated age, awake alert and oriented x3, no acute distress. HEENT:  Normocephalic atraumatic. Pupils equal round reactive light and accommodation, extra ocular muscles are intact. Oropharynx is clear moist without injection or exudate. Tongue and palate move normally. Turbinates appear normal.  Tympanic membranes appear normal.  NECK:  Supple nontender. No carotid bruits. Brisk carotid upstrokes, no JVD. No thyromegaly. LYMPH:  No supraclavicular cervical axillary or inguinal lymphadenopathy. LUNGS:  Clear to auscultation bilaterally. Excellent air entry. No inspiratory crackles or expiratory wheezes. HEART:  Regular rate and rhythm with soft aortic sclerosis ejection murmur, 1 out of 6 left upper sternal border, no rub gallop S3 or S4. ABDOMEN:  Soft, nontender, mildly obese. Normal bowel sounds. No guarding. No masses. No AAA or renal artery bruit. UROGENITAL:  Deferred  EXTREMITIES:  Warm and well perfused without clubbing cyanosis or edema. 2+ pulses in all 4 extremities.   Capillary refill less than 2 seconds. NEURO:  Cranial nerves 2-12 grossly intact. Normal muscle bulk and tone. No resting tremor, cogwheeling, normal rapid alternating movements in the hands and feet. No stocking paresthesia. Normal gait and station. MUSCULOSKELETAL:  Mild osteoarthritic changes. SKIN:  No worrisome lesions, skin a little dry. PSYCH:  No psychomotor retardation or agitation. Good eye contact. Unrestricted affect range. Mood congruent with affect. Linear thought.     LABS  Lab Review   Orders Only on 09/13/2021   Component Date Value    Amylase 09/13/2021 109     Protime 09/13/2021 10.7     INR 09/13/2021 0.95     HIV Ag/Ab 09/13/2021 Non-Reactive     HIV-1 Antibody 09/13/2021 Non-Reactive     HIV ANTIGEN 09/13/2021 Non-Reactive     HIV-2 Ab 09/13/2021 Non-Reactive     Hemoglobin A1C 09/13/2021 5.8     eAG 09/13/2021 119.8     Cholesterol, Total 09/13/2021 169     Triglycerides 09/13/2021 68     HDL 09/13/2021 50     LDL Calculated 09/13/2021 105*    VLDL Cholesterol Calcula* 09/13/2021 14     Vit D, 25-Hydroxy 09/13/2021 21.8*    TSH 09/13/2021 1.98     Retic Ct Pct 09/13/2021 0.98     Retic Ct Abs 09/13/2021 0.055     Immature Retic Fract 09/13/2021 0.48*    Hematocrit 09/13/2021 44.6     Iron 09/13/2021 72     TIBC 09/13/2021 267     Iron Saturation 09/13/2021 27     Hemoglobin, Elp 09/13/2021 See Comment     Interpretation + ELECTRO* 09/13/2021 REVIEWED    Admission on 08/27/2021, Discharged on 08/27/2021   Component Date Value    WBC 08/27/2021 8.2     RBC 08/27/2021 5.60     Hemoglobin 08/27/2021 14.7     Hematocrit 08/27/2021 43.9     MCV 08/27/2021 78.3*    MCH 08/27/2021 26.3     MCHC 08/27/2021 33.6     RDW 08/27/2021 14.6     Platelets 92/22/7592 236     MPV 08/27/2021 7.8     Neutrophils % 08/27/2021 72.4     Lymphocytes % 08/27/2021 19.6     Monocytes % 08/27/2021 6.7     Eosinophils % 08/27/2021 0.5     Basophils % 08/27/2021 0.8     Neutrophils Absolute 08/27/2021 6.0     Lymphocytes Absolute 08/27/2021 1.6     Monocytes Absolute 08/27/2021 0.6     Eosinophils Absolute 08/27/2021 0.0     Basophils Absolute 08/27/2021 0.1     Sodium 08/27/2021 138     Potassium reflex Magnesi* 08/27/2021 3.9     Chloride 08/27/2021 100     CO2 08/27/2021 26     Anion Gap 08/27/2021 12     Glucose 08/27/2021 126*    BUN 08/27/2021 7     CREATININE 08/27/2021 0.9     GFR Non- 08/27/2021 >60     GFR  08/27/2021 >60     Calcium 08/27/2021 9.8     Total Protein 08/27/2021 7.4     Albumin 08/27/2021 4.5     Albumin/Globulin Ratio 08/27/2021 1.6     Total Bilirubin 08/27/2021 1.2*    Alkaline Phosphatase 08/27/2021 73     ALT 08/27/2021 15     AST 08/27/2021 16     Globulin 08/27/2021 2.9     Lipase 08/27/2021 63.0*    Color, UA 08/27/2021 YELLOW     Clarity, UA 08/27/2021 CLOUDY*    Glucose, Ur 08/27/2021 Negative     Bilirubin Urine 08/27/2021 Negative     Ketones, Urine 08/27/2021 40*    Specific Gravity, UA 08/27/2021 1.005     Blood, Urine 08/27/2021 Negative     pH, UA 08/27/2021 6.5     Protein, UA 08/27/2021 Negative     Urobilinogen, Urine 08/27/2021 0.2     Nitrite, Urine 08/27/2021 Negative     Leukocyte Esterase, Urine 08/27/2021 Negative     Microscopic Examination 08/27/2021 YES     Urine Type 08/27/2021 NotGiven     Urine Reflex to Culture 08/27/2021 Not Indicated     WBC, UA 08/27/2021 0-2     RBC, UA 08/27/2021 None seen     Epithelial Cells, UA 08/27/2021 0-1          ASSESSMENT/PLAN  1. Essential hypertension  Is limiting sodium. He will ask bed partner about sleep apnea stop bang 4 out of 8.  amlodipine 5 mg at bedtime now that he is no longer drinking and taking a fiber supplement with pressures still in the 130s over 80s, confirmed home cuff accuracy here in the office today. Will add ARB. We will see back with home blood pressure readings in a month    2.  Microcytosis  Sickle trait not iron deficiency. No anemia. 3. Routine adult health maintenance  Not interested in flu or Covid yet, willing to take the Tdap today which we provided. 4. Rectal bleeding  No family history inflammatory bowel disease colon cancer. He has never had a colonoscopy. Classic history of antecedent longstanding constipation requiring straining, leading to wipe rectal bleeding and anal pruritus, however will need to exclude higher source. He has not had any rectal bleeding for couple months now but he is worried. Certainly he may have hemorrhoids that need to be banded. After labs will refer for colonoscopy possibly EGD given potential risk for  esophageal varices  FIT negative, pending- H. PYLORI ANTIGEN, STOOL; Future    5. Alcohol abuse  Currently abstinent. No thrombocytopenia, normal INR and now normal amylase. Abdominal though exam completely normal and no radiographic signs of pancreatitis. No follow-ups on file. It was a pleasure to visit with Mr. Sarina Van today. Answered all questions as best I could.   Lukas Gerardo MD   Time 32 minutes

## 2021-09-15 NOTE — PATIENT INSTRUCTIONS
1. You have Sickle Cell Trait (not disease), your kids have a 50% chance of also having sickle trait , so your grandkids to be want to know what their parents are \"carriers\" by dr testing using sickle cell hemoglobin electrophoresis    2. Amlodipine at bedtime, start irbesartan in AM    3. On AM of colonoscopy, take only your BP med small sip water at least hour before scheduled report time    4.  Tell lab you need h pylori collection kit     Ref Dr Maria G Simmons for colonoscopy    Bring home blood pressure readings

## 2022-03-17 RX ORDER — AMLODIPINE BESYLATE 5 MG/1
TABLET ORAL
Qty: 30 TABLET | Refills: 5 | Status: SHIPPED | OUTPATIENT
Start: 2022-03-17 | End: 2022-10-07

## 2022-03-17 NOTE — TELEPHONE ENCOUNTER
Medication:   Requested Prescriptions     Pending Prescriptions Disp Refills    amLODIPine (NORVASC) 5 MG tablet [Pharmacy Med Name: amLODIPine BESYLATE 5 MG TAB] 30 tablet 5     Sig: TAKE ONE TABLET BY MOUTH DAILY        Last Filled:      Patient Phone Number: [phone] (home)     Last appt: 9/15/2021   Next appt: Visit date not found    Last OARRS: No flowsheet data found.

## 2022-10-07 RX ORDER — AMLODIPINE BESYLATE 5 MG/1
TABLET ORAL
Qty: 30 TABLET | Refills: 5 | Status: SHIPPED | OUTPATIENT
Start: 2022-10-07

## 2022-10-07 NOTE — TELEPHONE ENCOUNTER
Future Appointments    This patient does not currently have any appointments scheduled.   Past Visits    Date Provider Specialty Visit Type Primary Dx   09/15/2021 Olga James MD Primary Care Office Visit Rectal bleeding   09/01/2021 Olga James MD Primary Care Office Visit Essential hypertension

## 2023-03-17 ENCOUNTER — OFFICE VISIT (OUTPATIENT)
Dept: PRIMARY CARE CLINIC | Age: 48
End: 2023-03-17
Payer: MEDICAID

## 2023-03-17 VITALS
SYSTOLIC BLOOD PRESSURE: 130 MMHG | TEMPERATURE: 98.1 F | OXYGEN SATURATION: 98 % | HEART RATE: 79 BPM | DIASTOLIC BLOOD PRESSURE: 94 MMHG | WEIGHT: 231.4 LBS | HEIGHT: 70 IN | BODY MASS INDEX: 33.13 KG/M2

## 2023-03-17 DIAGNOSIS — E78.00 ELEVATED LDL CHOLESTEROL LEVEL: Chronic | ICD-10-CM

## 2023-03-17 DIAGNOSIS — D57.3 SICKLE CELL TRAIT (HCC): ICD-10-CM

## 2023-03-17 DIAGNOSIS — I10 ESSENTIAL HYPERTENSION: Primary | Chronic | ICD-10-CM

## 2023-03-17 DIAGNOSIS — R73.09 ELEVATED GLUCOSE LEVEL: ICD-10-CM

## 2023-03-17 DIAGNOSIS — E55.9 VITAMIN D DEFICIENCY: ICD-10-CM

## 2023-03-17 PROCEDURE — G8417 CALC BMI ABV UP PARAM F/U: HCPCS | Performed by: FAMILY MEDICINE

## 2023-03-17 PROCEDURE — 99214 OFFICE O/P EST MOD 30 MIN: CPT | Performed by: FAMILY MEDICINE

## 2023-03-17 PROCEDURE — 3075F SYST BP GE 130 - 139MM HG: CPT | Performed by: FAMILY MEDICINE

## 2023-03-17 PROCEDURE — G8427 DOCREV CUR MEDS BY ELIG CLIN: HCPCS | Performed by: FAMILY MEDICINE

## 2023-03-17 PROCEDURE — 3080F DIAST BP >= 90 MM HG: CPT | Performed by: FAMILY MEDICINE

## 2023-03-17 PROCEDURE — 1036F TOBACCO NON-USER: CPT | Performed by: FAMILY MEDICINE

## 2023-03-17 PROCEDURE — G8484 FLU IMMUNIZE NO ADMIN: HCPCS | Performed by: FAMILY MEDICINE

## 2023-03-17 RX ORDER — AMLODIPINE BESYLATE 5 MG/1
TABLET ORAL
Qty: 90 TABLET | Refills: 1 | Status: SHIPPED | OUTPATIENT
Start: 2023-03-17

## 2023-03-17 RX ORDER — B-COMPLEX WITH VITAMIN C
1 TABLET ORAL 2 TIMES DAILY WITH MEALS
COMMUNITY

## 2023-03-17 RX ORDER — ERGOCALCIFEROL 1.25 MG/1
50000 CAPSULE ORAL WEEKLY
Qty: 12 CAPSULE | Refills: 3 | Status: SHIPPED | OUTPATIENT
Start: 2023-03-17

## 2023-03-17 SDOH — ECONOMIC STABILITY: FOOD INSECURITY: WITHIN THE PAST 12 MONTHS, THE FOOD YOU BOUGHT JUST DIDN'T LAST AND YOU DIDN'T HAVE MONEY TO GET MORE.: PATIENT DECLINED

## 2023-03-17 SDOH — ECONOMIC STABILITY: INCOME INSECURITY: HOW HARD IS IT FOR YOU TO PAY FOR THE VERY BASICS LIKE FOOD, HOUSING, MEDICAL CARE, AND HEATING?: PATIENT DECLINED

## 2023-03-17 SDOH — ECONOMIC STABILITY: FOOD INSECURITY: WITHIN THE PAST 12 MONTHS, YOU WORRIED THAT YOUR FOOD WOULD RUN OUT BEFORE YOU GOT MONEY TO BUY MORE.: PATIENT DECLINED

## 2023-03-17 SDOH — ECONOMIC STABILITY: HOUSING INSECURITY
IN THE LAST 12 MONTHS, WAS THERE A TIME WHEN YOU DID NOT HAVE A STEADY PLACE TO SLEEP OR SLEPT IN A SHELTER (INCLUDING NOW)?: PATIENT REFUSED

## 2023-03-17 ASSESSMENT — ENCOUNTER SYMPTOMS
DIARRHEA: 0
CONSTIPATION: 0
NAUSEA: 0
VOMITING: 0
SHORTNESS OF BREATH: 0
COUGH: 0

## 2023-03-17 ASSESSMENT — PATIENT HEALTH QUESTIONNAIRE - PHQ9
2. FEELING DOWN, DEPRESSED OR HOPELESS: 0
SUM OF ALL RESPONSES TO PHQ QUESTIONS 1-9: 0
SUM OF ALL RESPONSES TO PHQ QUESTIONS 1-9: 0
1. LITTLE INTEREST OR PLEASURE IN DOING THINGS: 0
SUM OF ALL RESPONSES TO PHQ QUESTIONS 1-9: 0
SUM OF ALL RESPONSES TO PHQ9 QUESTIONS 1 & 2: 0
DEPRESSION UNABLE TO ASSESS: PT REFUSES
SUM OF ALL RESPONSES TO PHQ QUESTIONS 1-9: 0

## 2023-03-17 NOTE — PROGRESS NOTES
Ranjith Cruz (:  1975) is a 41 y.o. male,Established patient, here for evaluation of the following chief complaint(s):  Established New Doctor      SUBJECTIVE:  3.17.23:  establish care with physician    Has been out of his medication for 2 months. Has two high school aged graduating kids, son is in robotics and his daughter is a  (does face painting and wants to do digital art)     Hypertension: Amlodipine 5 mg qhs -- never started the irbesartan. Next colonoscopy in       I have reviewed the chart notes available from myself and other providers. I have reviewed and addressed all active problems and created or updated the problems list in detail, as needed    I have extensively reviewed and reconciled the medication list, discontinued medications not taking or no longer appropriate, and updated the active meds list    OBJECTIVE:  Review of Systems   Constitutional:  Negative for chills and fever. Respiratory:  Negative for cough and shortness of breath. Cardiovascular:  Negative for leg swelling. Gastrointestinal:  Negative for constipation, diarrhea, nausea and vomiting. Endocrine: Negative for polyuria. Genitourinary:  Negative for frequency. Skin:  Negative for rash. Vitals:    23 1412   BP: (!) 130/94   Site: Right Upper Arm   Position: Sitting   Cuff Size: Large Adult   Pulse: 79   Temp: 98.1 °F (36.7 °C)   SpO2: 98%   Weight: 231 lb 6.4 oz (105 kg)   Height: 5' 10\" (1.778 m)      Body mass index is 33.2 kg/m². Physical Exam  Constitutional:       Appearance: Normal appearance. Cardiovascular:      Rate and Rhythm: Normal rate and regular rhythm. Pulses: Normal pulses. Heart sounds: Normal heart sounds. Pulmonary:      Effort: Pulmonary effort is normal.      Breath sounds: Normal breath sounds. Musculoskeletal:      Right lower leg: No edema. Left lower leg: No edema. Neurological:      General: No focal deficit present. Mental Status: He is alert. Mental status is at baseline. Lab Results   Component Value Date    LABA1C 5.8 09/13/2021     Lab Results   Component Value Date    WBC 8.2 08/27/2021    HGB 14.7 08/27/2021    HCT 44.6 09/13/2021    MCV 78.3 (L) 08/27/2021     08/27/2021      Lab Results   Component Value Date/Time     08/27/2021 09:09 AM    K 3.9 08/27/2021 09:09 AM     08/27/2021 09:09 AM    CO2 26 08/27/2021 09:09 AM    BUN 7 08/27/2021 09:09 AM    CREATININE 0.9 08/27/2021 09:09 AM    GLUCOSE 126 08/27/2021 09:09 AM    CALCIUM 9.8 08/27/2021 09:09 AM       Lab Results   Component Value Date    CHOL 169 09/13/2021     Lab Results   Component Value Date    TRIG 68 09/13/2021     Lab Results   Component Value Date    HDL 50 09/13/2021     Lab Results   Component Value Date    LDLCALC 105 (H) 09/13/2021     Lab Results   Component Value Date    LABVLDL 14 09/13/2021     No results found for: CHOLHDLRATIO     The 10-year ASCVD risk score (Yovani SHELLEY, et al., 2019) is: 6.7%    Values used to calculate the score:      Age: 41 years      Sex: Male      Is Non- : Yes      Diabetic: No      Tobacco smoker: No      Systolic Blood Pressure: 748 mmHg      Is BP treated: Yes      HDL Cholesterol: 50 mg/dL      Total Cholesterol: 169 mg/dL     Patient received counseling and, if relevant, printed instructions for all symptoms listed in CC and HPI, as well as for all diagnoses brought onto today's visit note below. Typical counseling includes, but is not limited to, non-pharmacologic measures to manage listed symptoms and conditions; appropriate use, risks and benefits for all prescribed medications; potential interactions between medications both prescribed and OTC; diet; exercise; healthy behaviors; and goalsetting to improve health. Patient or responsible party was involved in shared decision making and had opportunity to have all questions answered.   Except as noted below, all chronic problems have been reviewed and are stable to continue medications or other therapy as previously documented in the patient's chart, with changes per orders or documentation below:    1. Essential hypertension  Comments:  restart amlodipine 5 mg, will check renal fxn  Orders:  -     Comprehensive Metabolic Panel; Future  -     amLODIPine (NORVASC) 5 MG tablet; TAKE ONE TABLET BY MOUTH DAILY, Disp-90 tablet, R-1Normal  2. Elevated LDL cholesterol level  Comments:  will check lipid level  Orders:  -     Lipid Panel; Future  3. Elevated glucose level  -     Hemoglobin A1C; Future  4. Sickle cell trait ([facility] [address]  5. Vitamin D deficiency  -     vitamin D (ERGOCALCIFEROL) 1.25 MG (10896 UT) CAPS capsule; Take 1 capsule by mouth once a week, Disp-12 capsule, R-3Normal  -     Vitamin D 25 Hydroxy; Future        Problem List          Unprioritized    Essential hypertension - Primary    Relevant Medications    amLODIPine (NORVASC) 5 MG tablet    Other Relevant Orders    Comprehensive Metabolic Panel    Sickle cell trait ([facility] [address]     Orders Placed This Encounter   Procedures    Hemoglobin A1C     Standing Status:   Future     Standing Expiration Date:   3/17/2024    Comprehensive Metabolic Panel     Standing Status:   Future     Standing Expiration Date:   3/17/2024    Lipid Panel     Standing Status:   Future     Standing Expiration Date:   3/17/2024    Vitamin D 25 Hydroxy     Standing Status:   Future     Standing Expiration Date:   3/17/2024       Return in about 6 months (around 9/17/2023) for chronic conditions. Dr. Sanju Martin and [facility]      Usual doctor's hours are:       Monday 7:00 am to 5:30 pm  Wednesday 7:00 am to 4:30 pm  Thursday 7:00 am to 4:30 pm  Friday 7:00 am to 3:30 pm  Saturdays, Sundays, and after hours: E-Visits are available    We observe most federal holidays and Good Friday.     We ask that you only contact the office one time per issue or question, and please allow one full business day for a call back. Calling us back multiple times keeps us from being able to complete the work efficiently for you and our other patients. For medication renewals, please call your pharmacist to contact us, and be sure to allow at least 3 business days for processing before you need to  your medication. If you are sick or need an appointment that hasn't been planned, same day appointments are available every day the office is open: Monday, Tuesday, Wednesday, Thursday, and Friday. Call during office hours to schedule, even if it may not be with your regular physician. You may also call the office after 8 am on office days if you need to be seen from an issue the night before. During hours when the office is not normally open, your call will go to the messaging service which cannot provide any service other than paging the doctor. No prescriptions or other nonurgent matters will be handled and no voicemail is available, so please call back during office hours for these matters.        Electronically signed by Leroy Roper DO on 3/17/2023 at 2:53 PM.

## 2023-04-24 ENCOUNTER — TELEPHONE (OUTPATIENT)
Dept: PRIMARY CARE CLINIC | Age: 48
End: 2023-04-24

## 2023-04-24 ENCOUNTER — OFFICE VISIT (OUTPATIENT)
Dept: PRIMARY CARE CLINIC | Age: 48
End: 2023-04-24
Payer: MEDICAID

## 2023-04-24 VITALS
WEIGHT: 225 LBS | HEART RATE: 56 BPM | TEMPERATURE: 97.3 F | OXYGEN SATURATION: 98 % | BODY MASS INDEX: 32.28 KG/M2 | SYSTOLIC BLOOD PRESSURE: 140 MMHG | DIASTOLIC BLOOD PRESSURE: 90 MMHG

## 2023-04-24 DIAGNOSIS — I10 ESSENTIAL HYPERTENSION: Chronic | ICD-10-CM

## 2023-04-24 DIAGNOSIS — F32.A ANXIETY AND DEPRESSION: Primary | Chronic | ICD-10-CM

## 2023-04-24 DIAGNOSIS — F41.9 ANXIETY AND DEPRESSION: Primary | Chronic | ICD-10-CM

## 2023-04-24 PROCEDURE — 3077F SYST BP >= 140 MM HG: CPT | Performed by: FAMILY MEDICINE

## 2023-04-24 PROCEDURE — G8417 CALC BMI ABV UP PARAM F/U: HCPCS | Performed by: FAMILY MEDICINE

## 2023-04-24 PROCEDURE — 99214 OFFICE O/P EST MOD 30 MIN: CPT | Performed by: FAMILY MEDICINE

## 2023-04-24 PROCEDURE — 3080F DIAST BP >= 90 MM HG: CPT | Performed by: FAMILY MEDICINE

## 2023-04-24 PROCEDURE — G8427 DOCREV CUR MEDS BY ELIG CLIN: HCPCS | Performed by: FAMILY MEDICINE

## 2023-04-24 PROCEDURE — 1036F TOBACCO NON-USER: CPT | Performed by: FAMILY MEDICINE

## 2023-04-24 RX ORDER — LOSARTAN POTASSIUM 25 MG/1
25 TABLET ORAL DAILY
Qty: 90 TABLET | Refills: 0 | Status: SHIPPED | OUTPATIENT
Start: 2023-04-24

## 2023-04-24 RX ORDER — FLUOXETINE HYDROCHLORIDE 20 MG/1
20 CAPSULE ORAL DAILY
Qty: 90 CAPSULE | Refills: 1 | Status: SHIPPED | OUTPATIENT
Start: 2023-04-24

## 2023-04-24 ASSESSMENT — ENCOUNTER SYMPTOMS
COUGH: 0
SHORTNESS OF BREATH: 0
VOMITING: 0
CONSTIPATION: 0
NAUSEA: 0
DIARRHEA: 0

## 2023-04-24 ASSESSMENT — ANXIETY QUESTIONNAIRES
7. FEELING AFRAID AS IF SOMETHING AWFUL MIGHT HAPPEN: 3
5. BEING SO RESTLESS THAT IT IS HARD TO SIT STILL: 3
4. TROUBLE RELAXING: 3
6. BECOMING EASILY ANNOYED OR IRRITABLE: 3
2. NOT BEING ABLE TO STOP OR CONTROL WORRYING: 3
IF YOU CHECKED OFF ANY PROBLEMS ON THIS QUESTIONNAIRE, HOW DIFFICULT HAVE THESE PROBLEMS MADE IT FOR YOU TO DO YOUR WORK, TAKE CARE OF THINGS AT HOME, OR GET ALONG WITH OTHER PEOPLE: EXTREMELY DIFFICULT
1. FEELING NERVOUS, ANXIOUS, OR ON EDGE: 3
GAD7 TOTAL SCORE: 21
3. WORRYING TOO MUCH ABOUT DIFFERENT THINGS: 3

## 2023-04-24 ASSESSMENT — PATIENT HEALTH QUESTIONNAIRE - PHQ9
7. TROUBLE CONCENTRATING ON THINGS, SUCH AS READING THE NEWSPAPER OR WATCHING TELEVISION: 2
10. IF YOU CHECKED OFF ANY PROBLEMS, HOW DIFFICULT HAVE THESE PROBLEMS MADE IT FOR YOU TO DO YOUR WORK, TAKE CARE OF THINGS AT HOME, OR GET ALONG WITH OTHER PEOPLE: 0
SUM OF ALL RESPONSES TO PHQ QUESTIONS 1-9: 4
9. THOUGHTS THAT YOU WOULD BE BETTER OFF DEAD, OR OF HURTING YOURSELF: 0
3. TROUBLE FALLING OR STAYING ASLEEP: 1
SUM OF ALL RESPONSES TO PHQ QUESTIONS 1-9: 4
4. FEELING TIRED OR HAVING LITTLE ENERGY: 1
1. LITTLE INTEREST OR PLEASURE IN DOING THINGS: 0
SUM OF ALL RESPONSES TO PHQ QUESTIONS 1-9: 4
5. POOR APPETITE OR OVEREATING: 0
8. MOVING OR SPEAKING SO SLOWLY THAT OTHER PEOPLE COULD HAVE NOTICED. OR THE OPPOSITE, BEING SO FIGETY OR RESTLESS THAT YOU HAVE BEEN MOVING AROUND A LOT MORE THAN USUAL: 0
6. FEELING BAD ABOUT YOURSELF - OR THAT YOU ARE A FAILURE OR HAVE LET YOURSELF OR YOUR FAMILY DOWN: 0
2. FEELING DOWN, DEPRESSED OR HOPELESS: 0
SUM OF ALL RESPONSES TO PHQ QUESTIONS 1-9: 4
SUM OF ALL RESPONSES TO PHQ9 QUESTIONS 1 & 2: 0

## 2023-04-24 NOTE — PROGRESS NOTES
to contact us, and be sure to allow at least 3 business days for processing before you need to  your medication. If you are sick or need an appointment that hasn't been planned, same day appointments are available every day the office is open: Monday, Tuesday, Wednesday, Thursday, and Friday. Call during office hours to schedule, even if it may not be with your regular physician. You may also call the office after 8 am on office days if you need to be seen from an issue the night before. During hours when the office is not normally open, your call will go to the messaging service which cannot provide any service other than paging the doctor. No prescriptions or other nonurgent matters will be handled and no voicemail is available, so please call back during office hours for these matters.        Electronically signed by Olga Ingram DO on 4/24/2023 at 3:49 PM.

## 2023-07-25 NOTE — TELEPHONE ENCOUNTER
Medication:   Requested Prescriptions     Pending Prescriptions Disp Refills    losartan (COZAAR) 25 MG tablet [Pharmacy Med Name: LOSARTAN POTASSIUM 25 MG TAB] 90 tablet 0     Sig: TAKE 1 TABLET BY MOUTH EVERY DAY        Last Filled:      Patient Phone Number: [phone] (home)     Last appt: 4/24/2023   Next appt: 9/18/2023    Last OARRS: No flowsheet data found.

## 2023-07-26 RX ORDER — LOSARTAN POTASSIUM 25 MG/1
TABLET ORAL
Qty: 90 TABLET | Refills: 0 | Status: SHIPPED | OUTPATIENT
Start: 2023-07-26

## 2023-09-22 ENCOUNTER — TELEMEDICINE (OUTPATIENT)
Dept: PRIMARY CARE CLINIC | Age: 48
End: 2023-09-22
Payer: MEDICAID

## 2023-09-22 DIAGNOSIS — E55.9 VITAMIN D DEFICIENCY: Chronic | ICD-10-CM

## 2023-09-22 DIAGNOSIS — F41.9 ANXIETY AND DEPRESSION: Primary | Chronic | ICD-10-CM

## 2023-09-22 DIAGNOSIS — I10 ESSENTIAL HYPERTENSION: Chronic | ICD-10-CM

## 2023-09-22 DIAGNOSIS — F32.A ANXIETY AND DEPRESSION: Primary | Chronic | ICD-10-CM

## 2023-09-22 PROCEDURE — 99214 OFFICE O/P EST MOD 30 MIN: CPT | Performed by: FAMILY MEDICINE

## 2023-09-22 PROCEDURE — G8427 DOCREV CUR MEDS BY ELIG CLIN: HCPCS | Performed by: FAMILY MEDICINE

## 2023-09-22 RX ORDER — ERGOCALCIFEROL 1.25 MG/1
50000 CAPSULE ORAL WEEKLY
Qty: 12 CAPSULE | Refills: 4 | Status: SHIPPED | OUTPATIENT
Start: 2023-09-22

## 2023-09-22 RX ORDER — FLUOXETINE HYDROCHLORIDE 20 MG/1
20 CAPSULE ORAL DAILY
Qty: 90 CAPSULE | Refills: 2 | Status: SHIPPED | OUTPATIENT
Start: 2023-09-22

## 2023-09-22 RX ORDER — LOSARTAN POTASSIUM 25 MG/1
25 TABLET ORAL DAILY
Qty: 90 TABLET | Refills: 2 | Status: SHIPPED | OUTPATIENT
Start: 2023-09-22

## 2023-09-22 ASSESSMENT — ENCOUNTER SYMPTOMS
CONSTIPATION: 0
COUGH: 0
VOMITING: 0
DIARRHEA: 0
SHORTNESS OF BREATH: 0
NAUSEA: 0

## 2023-09-22 NOTE — PROGRESS NOTES
depression    Was on amlodipine for a few weeks and felt like his blood pressure was still high and not affected by taking the amlodipine. He also felt like his anxiety was much worse and poorly controlled when he was taking the amlodipine. Will stop this. He did prev try 'water pill' in the past which he would prefer not to try again. Discussed b-blocker and the side effects of it, which he would also like to stay away from. In agreement on starting ARB (losartan) with close follow up. 3.17.23:  establish care with physician    Has been out of his medication for 2 months. Has two high school aged graduating kids, son is in [employer] and his daughter is a  (does face painting and wants to do digital art)     Hypertension: Amlodipine 5 mg qhs -- never started the irbesartan. Next colonoscopy in 2032      Review of Systems   Constitutional:  Negative for chills and fever. Respiratory:  Negative for cough and shortness of breath. Cardiovascular:  Negative for leg swelling. Gastrointestinal:  Negative for constipation, diarrhea, nausea and vomiting. Endocrine: Negative for polyuria. Genitourinary:  Negative for frequency. Skin:  Negative for rash.           Objective   Patient-Reported Vitals  No data recorded     Physical Exam  [INSTRUCTIONS:  \"[x]\" Indicates a positive item  \"[]\" Indicates a negative item  -- DELETE ALL ITEMS NOT EXAMINED]    Constitutional: [x] Appears well-developed and well-nourished [x] No apparent distress      [] Abnormal -     Mental status: [x] Alert and awake  [x] Oriented to person/place/time [x] Able to follow commands    [] Abnormal -     Eyes:   EOM    [x]  Normal    [] Abnormal -   Sclera  [x]  Normal    [] Abnormal -          Discharge [x]  None visible   [] Abnormal -     HENT: [x] Normocephalic, atraumatic  [] Abnormal -   [x] Mouth/Throat: Mucous membranes are moist    External Ears [x] Normal  [] Abnormal -    Neck: [x] No visualized mass []

## 2023-10-23 RX ORDER — LOSARTAN POTASSIUM 25 MG/1
25 TABLET ORAL DAILY
Qty: 90 TABLET | Refills: 2 | Status: SHIPPED | OUTPATIENT
Start: 2023-10-23

## 2023-11-01 NOTE — TELEPHONE ENCOUNTER
Patient requesting a medication refill.   Pharmacy: [facility]  Next office visit: Visit date not found  Last regular office visit: 9/22/2023

## 2023-11-02 RX ORDER — FLUOXETINE HYDROCHLORIDE 20 MG/1
20 CAPSULE ORAL DAILY
Qty: 90 CAPSULE | Refills: 2 | Status: SHIPPED | OUTPATIENT
Start: 2023-11-02

## 2024-07-15 NOTE — TELEPHONE ENCOUNTER
Medication:   Requested Prescriptions     Pending Prescriptions Disp Refills    losartan (COZAAR) 25 MG tablet [Pharmacy Med Name: LOSARTAN POTASSIUM 25 MG TAB] 90 tablet 2     Sig: TAKE 1 TABLET BY MOUTH EVERY DAY        Last Filled:  [unfilled]    Patient Phone Number: [phone] (home)     Last appt: 9/22/2023   Next appt: Visit date not found    Last OARRS:        No data to display

## 2024-07-17 RX ORDER — LOSARTAN POTASSIUM 25 MG/1
25 TABLET ORAL DAILY
Qty: 90 TABLET | Refills: 2 | Status: SHIPPED | OUTPATIENT
Start: 2024-07-17

## 2024-10-09 NOTE — TELEPHONE ENCOUNTER
Medication:   Requested Prescriptions     Pending Prescriptions Disp Refills    FLUoxetine (PROZAC) 20 MG capsule [Pharmacy Med Name: FLUoxetine HCL 20 MG CAPSULE] 90 capsule 2     Sig: TAKE 1 CAPSULE BY MOUTH DAILY        Last Filled:      Patient Phone Number: [phone] (home)     Last appt: 9/22/2023   Next appt: Visit date not found    Last OARRS:        No data to display

## 2025-04-14 RX ORDER — LOSARTAN POTASSIUM 25 MG/1
25 TABLET ORAL DAILY
Qty: 90 TABLET | Refills: 2 | OUTPATIENT
Start: 2025-04-14

## 2025-04-14 NOTE — TELEPHONE ENCOUNTER
Left message on machine per HIP[employer]  Last OV 09/22/2023. Refill for Losartan denied. Please schedule a F/U.